# Patient Record
Sex: MALE | Race: WHITE | NOT HISPANIC OR LATINO | Employment: OTHER | URBAN - NONMETROPOLITAN AREA
[De-identification: names, ages, dates, MRNs, and addresses within clinical notes are randomized per-mention and may not be internally consistent; named-entity substitution may affect disease eponyms.]

---

## 2017-02-21 ENCOUNTER — OFFICE VISIT (OUTPATIENT)
Dept: OCCUPATIONAL MEDICINE | Age: 42
End: 2017-02-21

## 2017-02-21 DIAGNOSIS — Z02.1 DRUG TESTING, PRE-EMPLOYMENT: Primary | ICD-10-CM

## 2017-02-21 PROCEDURE — OH035 DOT/N-DOT DS COLLECTION ONLY (ALL TYPES): Performed by: PHYSICIAN ASSISTANT

## 2017-02-21 PROCEDURE — OH044 DRUG SCREEN HAIR COLLECTION ONLY: Performed by: PHYSICIAN ASSISTANT

## 2017-08-09 ENCOUNTER — OFFICE VISIT (OUTPATIENT)
Dept: OCCUPATIONAL MEDICINE | Age: 42
End: 2017-08-09

## 2017-08-09 ENCOUNTER — APPOINTMENT (OUTPATIENT)
Dept: REHABILITATION | Age: 42
End: 2017-08-09

## 2017-08-09 VITALS
TEMPERATURE: 98 F | RESPIRATION RATE: 16 BRPM | DIASTOLIC BLOOD PRESSURE: 70 MMHG | HEART RATE: 68 BPM | SYSTOLIC BLOOD PRESSURE: 118 MMHG | WEIGHT: 200 LBS | BODY MASS INDEX: 27.09 KG/M2 | HEIGHT: 72 IN

## 2017-08-09 DIAGNOSIS — Z02.1 PRE-EMPLOYMENT HEALTH SCREENING EXAMINATION: ICD-10-CM

## 2017-08-09 DIAGNOSIS — Z02.89 ENCOUNTER FOR OCCUPATIONAL HEALTH EXAMINATION: Primary | ICD-10-CM

## 2017-08-09 LAB
BILIRUB UR QL: NORMAL
CLARITY UR: NORMAL
COLOR UR: NORMAL
CULT IF POS: NORMAL
GLUCOSE UR-MCNC: NORMAL MG/DL
HGB UR QL: NORMAL
KETONES UR-MCNC: NORMAL MG/DL
LEUKOCYTE ESTERASE UR QL STRIP: NORMAL
NITRITE UR QL: NORMAL
PH UR: NORMAL [PH]
PROT UR QL: NORMAL
SP GR UR STRIP: 1.01
UROBILINOGEN UR STRIP-MCNC: NORMAL MG/DL

## 2017-08-09 PROCEDURE — 81003 URINALYSIS AUTO W/O SCOPE: CPT | Performed by: CHIROPRACTOR

## 2017-08-09 PROCEDURE — OH048 DOT VISION: Performed by: CHIROPRACTOR

## 2017-08-09 PROCEDURE — OH023 DOT/CDL PHYSICAL EXAM: Performed by: CHIROPRACTOR

## 2017-08-09 PROCEDURE — OH053 WHISPER TEST PERFORMED IN OCC HEALTH: Performed by: CHIROPRACTOR

## 2017-08-09 PROCEDURE — OH168 POST OFFER LEVE 11: Performed by: CHIROPRACTOR

## 2018-02-22 ENCOUNTER — OFFICE VISIT (OUTPATIENT)
Dept: OCCUPATIONAL MEDICINE | Age: 43
End: 2018-02-22

## 2018-02-22 VITALS
WEIGHT: 212 LBS | HEART RATE: 80 BPM | DIASTOLIC BLOOD PRESSURE: 82 MMHG | HEIGHT: 72 IN | SYSTOLIC BLOOD PRESSURE: 124 MMHG | BODY MASS INDEX: 28.71 KG/M2

## 2018-02-22 DIAGNOSIS — Z02.89 ENCOUNTER FOR OCCUPATIONAL HEALTH EXAMINATION: Primary | ICD-10-CM

## 2018-02-22 PROCEDURE — OH053 WHISPER TEST PERFORMED IN OCC HEALTH: Performed by: NURSE PRACTITIONER

## 2018-02-22 PROCEDURE — 81003 URINALYSIS AUTO W/O SCOPE: CPT | Performed by: NURSE PRACTITIONER

## 2018-02-22 PROCEDURE — OH023 DOT/CDL PHYSICAL EXAM: Performed by: NURSE PRACTITIONER

## 2018-02-22 PROCEDURE — OH035 DOT/N-DOT DS COLLECTION ONLY (ALL TYPES): Performed by: NURSE PRACTITIONER

## 2018-02-22 PROCEDURE — OH048 DOT VISION: Performed by: NURSE PRACTITIONER

## 2018-10-04 ENCOUNTER — OFFICE VISIT (OUTPATIENT)
Dept: OCCUPATIONAL MEDICINE | Age: 43
End: 2018-10-04

## 2018-10-04 DIAGNOSIS — Z02.1 DRUG TESTING, PRE-EMPLOYMENT: ICD-10-CM

## 2018-10-04 PROCEDURE — OH035 DOT/N-DOT DS COLLECTION ONLY (ALL TYPES): Performed by: NURSE PRACTITIONER

## 2019-01-30 ENCOUNTER — OFFICE VISIT (OUTPATIENT)
Dept: OCCUPATIONAL MEDICINE | Age: 44
End: 2019-01-30

## 2019-01-30 VITALS
HEART RATE: 66 BPM | BODY MASS INDEX: 25.81 KG/M2 | SYSTOLIC BLOOD PRESSURE: 124 MMHG | HEIGHT: 72 IN | WEIGHT: 190.6 LBS | DIASTOLIC BLOOD PRESSURE: 84 MMHG

## 2019-01-30 DIAGNOSIS — Z02.89 ENCOUNTER FOR OCCUPATIONAL HEALTH EXAMINATION: Primary | ICD-10-CM

## 2019-01-30 LAB
APPEARANCE UR: NORMAL
BILIRUB UR QL: NEGATIVE
COLOR UR: YELLOW
GLUCOSE BLDC GLUCOMTR-MCNC: 81 MG/DL
GLUCOSE UR-MCNC: NORMAL MG/DL
HGB UR QL: NEGATIVE
KETONES UR-MCNC: NORMAL MG/DL
LENGTH OF FAST TIME PATIENT: NORMAL H
LEUKOCYTE ESTERASE UR QL STRIP: NEGATIVE
NITRITE UR QL: NEGATIVE
PH UR: 6 [PH]
PROT UR QL: NEGATIVE
SP GR UR: 1.03
SPECIMEN SOURCE: NORMAL
URNS CMNT MICRO: NORMAL
UROBILINOGEN UR QL: NORMAL

## 2019-01-30 PROCEDURE — OH048 DOT VISION: Performed by: PREVENTIVE MEDICINE

## 2019-01-30 PROCEDURE — 82962 GLUCOSE BLOOD TEST: CPT | Performed by: PREVENTIVE MEDICINE

## 2019-01-30 PROCEDURE — OH035 DOT/N-DOT DS COLLECTION ONLY (ALL TYPES): Performed by: PREVENTIVE MEDICINE

## 2019-01-30 PROCEDURE — OH023 DOT/CDL PHYSICAL EXAM: Performed by: PREVENTIVE MEDICINE

## 2019-01-30 PROCEDURE — 81003 URINALYSIS AUTO W/O SCOPE: CPT | Performed by: PREVENTIVE MEDICINE

## 2019-01-30 PROCEDURE — OH053 WHISPER TEST PERFORMED IN OCC HEALTH: Performed by: PREVENTIVE MEDICINE

## 2019-01-30 PROCEDURE — OH044 DRUG SCREEN HAIR COLLECTION ONLY: Performed by: PREVENTIVE MEDICINE

## 2019-03-05 ENCOUNTER — OCC HEALTH (OUTPATIENT)
Dept: OCCUPATIONAL MEDICINE | Age: 44
End: 2019-03-05

## 2019-03-05 VITALS
HEIGHT: 72 IN | HEART RATE: 66 BPM | BODY MASS INDEX: 26.32 KG/M2 | SYSTOLIC BLOOD PRESSURE: 108 MMHG | DIASTOLIC BLOOD PRESSURE: 70 MMHG | WEIGHT: 194.3 LBS

## 2019-03-05 DIAGNOSIS — Z02.89 VISIT FOR OCCUPATIONAL HEALTH EXAMINATION: ICD-10-CM

## 2019-03-05 DIAGNOSIS — Z02.89 ENCOUNTER FOR OCCUPATIONAL HEALTH EXAMINATION: Primary | ICD-10-CM

## 2019-03-05 PROCEDURE — OH021 PRE PLACEMENT PHYSICAL EXAM: Performed by: CHIROPRACTOR

## 2019-03-05 PROCEDURE — OH143 RAPID TEST DRUG KIT & COLLECTION 10 PANEL: Performed by: CHIROPRACTOR

## 2019-08-06 ENCOUNTER — OFFICE VISIT (OUTPATIENT)
Dept: REHABILITATION | Age: 44
End: 2019-08-06

## 2019-08-06 ENCOUNTER — OFFICE VISIT (OUTPATIENT)
Dept: OCCUPATIONAL MEDICINE | Age: 44
End: 2019-08-06

## 2019-08-06 VITALS
DIASTOLIC BLOOD PRESSURE: 82 MMHG | SYSTOLIC BLOOD PRESSURE: 132 MMHG | HEART RATE: 66 BPM | BODY MASS INDEX: 27.04 KG/M2 | WEIGHT: 199.6 LBS | TEMPERATURE: 98.1 F | HEIGHT: 72 IN | RESPIRATION RATE: 16 BRPM

## 2019-08-06 DIAGNOSIS — Z02.89 ENCOUNTER FOR OCCUPATIONAL HEALTH EXAMINATION: Primary | ICD-10-CM

## 2019-08-06 LAB
APPEARANCE UR: NORMAL
BILIRUB UR QL: NORMAL
COLOR UR: NORMAL
GLUCOSE UR-MCNC: NORMAL MG/DL
HGB UR QL: NORMAL
KETONES UR-MCNC: NORMAL MG/DL
LEUKOCYTE ESTERASE UR QL STRIP: NORMAL
NITRITE UR QL: NORMAL
PH UR: NORMAL [PH]
PROT UR QL: NORMAL
SP GR UR: 1.01
SPECIMEN SOURCE: NORMAL
URNS CMNT MICRO: NORMAL
UROBILINOGEN UR QL: NORMAL

## 2019-08-06 PROCEDURE — OH053 WHISPER TEST PERFORMED IN OCC HEALTH: Performed by: NURSE PRACTITIONER

## 2019-08-06 PROCEDURE — OH039 DOT DRUG SCREENS BUNDLED: Performed by: NURSE PRACTITIONER

## 2019-08-06 PROCEDURE — OH048 DOT VISION: Performed by: NURSE PRACTITIONER

## 2019-08-06 PROCEDURE — 81003 URINALYSIS AUTO W/O SCOPE: CPT | Performed by: NURSE PRACTITIONER

## 2019-08-06 PROCEDURE — OH023 DOT/CDL PHYSICAL EXAM: Performed by: NURSE PRACTITIONER

## 2019-08-06 PROCEDURE — OH063 AUDIOMETRIC TESTING INTERP: Performed by: NURSE PRACTITIONER

## 2019-08-06 PROCEDURE — OH056 PRE PLACEMENT FUNCTIONAL TESTING: Performed by: PHYSICAL THERAPY ASSISTANT

## 2019-08-06 PROCEDURE — 92552 PURE TONE AUDIOMETRY AIR: CPT | Performed by: NURSE PRACTITIONER

## 2020-03-25 ENCOUNTER — OFFICE VISIT (OUTPATIENT)
Dept: OCCUPATIONAL MEDICINE | Age: 45
End: 2020-03-25

## 2020-03-25 DIAGNOSIS — Z02.1 DRUG TESTING, PRE-EMPLOYMENT: Primary | ICD-10-CM

## 2020-03-25 PROCEDURE — OH172 ESCREEN DRUG COLLECTION ONLY: Performed by: CHIROPRACTOR

## 2020-05-19 ENCOUNTER — NURSE ONLY (OUTPATIENT)
Dept: OCCUPATIONAL MEDICINE | Age: 45
End: 2020-05-19

## 2020-05-19 DIAGNOSIS — Z02.83 ENCOUNTER FOR EMPLOYMENT-RELATED DRUG TESTING: Primary | ICD-10-CM

## 2020-05-19 PROCEDURE — OH172 ESCREEN DRUG COLLECTION ONLY: Performed by: NURSE PRACTITIONER

## 2020-08-02 ENCOUNTER — HOSPITAL ENCOUNTER (EMERGENCY)
Facility: HOSPITAL | Age: 45
Discharge: HOME | End: 2020-08-02

## 2020-08-02 VITALS
SYSTOLIC BLOOD PRESSURE: 136 MMHG | BODY MASS INDEX: 26.23 KG/M2 | HEART RATE: 103 BPM | OXYGEN SATURATION: 96 % | DIASTOLIC BLOOD PRESSURE: 84 MMHG | WEIGHT: 183.2 LBS | HEIGHT: 70 IN | TEMPERATURE: 97.9 F | RESPIRATION RATE: 16 BRPM

## 2020-08-02 DIAGNOSIS — S61.219A FINGER LACERATION: Primary | ICD-10-CM

## 2020-08-02 PROCEDURE — 99282 EMERGENCY DEPT VISIT SF MDM: CPT

## 2020-08-02 PROCEDURE — 12002 RPR S/N/AX/GEN/TRNK2.6-7.5CM: CPT | Performed by: NURSE PRACTITIONER

## 2020-08-02 RX ORDER — NAPROXEN 500 MG/1
500 TABLET ORAL 2 TIMES DAILY WITH MEALS
Qty: 60 TABLET | Refills: 0 | Status: SHIPPED | OUTPATIENT
Start: 2020-08-02 | End: 2020-09-01

## 2020-08-02 NOTE — ED PROVIDER NOTES
Chief Complaint   Patient presents with   • Finger Laceration     pt arrives after cutting his left fifth finger with a utility knife, site is bleeding at this time            HPI:    Patient is a 44 y.o. male who presents with with a laceration to the left proximal palmar of the fifth finger.  Patient states that he cut this with a utility knife.  Patient was voicing concern is Friday is unable to keep bleeding controlled at this time.  Patient states he is up-to-date on his immunization including his tetanus.  Denies any numbness or tingling.  He is able to demonstrate full extension and flexion of the finger,        History reviewed. No pertinent past medical history.    History reviewed. No pertinent surgical history.    Social History     Socioeconomic History   • Marital status:      Spouse name: Not on file   • Number of children: Not on file   • Years of education: Not on file   • Highest education level: Not on file   Occupational History   • Not on file   Social Needs   • Financial resource strain: Not on file   • Food insecurity     Worry: Not on file     Inability: Not on file   • Transportation needs     Medical: Not on file     Non-medical: Not on file   Tobacco Use   • Smoking status: Current Every Day Smoker     Packs/day: 1.00     Types: Cigarettes   Substance and Sexual Activity   • Alcohol use: Not on file   • Drug use: Not on file   • Sexual activity: Not on file   Lifestyle   • Physical activity     Days per week: Not on file     Minutes per session: Not on file   • Stress: Not on file   Relationships   • Social connections     Talks on phone: Not on file     Gets together: Not on file     Attends [employer] service: Not on file     Active member of club or organization: Not on file     Attends meetings of clubs or organizations: Not on file     Relationship status: Not on file   • Intimate partner violence     Fear of current or ex partner: Not on file     Emotionally abused: Not on file      Physically abused: Not on file     Forced sexual activity: Not on file   Other Topics Concern   • Not on file   Social History Narrative   • Not on file       History reviewed. No pertinent family history.    No Known Allergies      Current Outpatient Medications:   •  naproxen (NAPROSYN) 500 mg tablet, Take 1 tablet (500 mg total) by mouth 2 (two) times a day with meals, Disp: 60 tablet, Rfl: 0      ROS:  Constitutional: negative for fever  Eyes: negative for eye pain  ENT: negative for sore throat  Cardiovascular: negative for chest pain  Respiratory: negative for hemoptysis  GI: negative for abdominal pain  : negative for hematuria  Musculoskeletal: negative for back pain, positive left fifth finger laceration  Neuro: negative for headache  Hematology: negative for bleeding  Immunology: negative for joint pain      ED Triage Vitals [08/02/20 1310]   Temp Heart Rate Resp BP SpO2   36.6 °C (97.9 °F) 103 16 136/84 96 %      Temp Source Heart Rate Source Patient Position BP Location FiO2 (%)   Oral -- -- -- --         Physical Exam:  Nursing note and vitals reviewed.  Constitutional: Nontoxic, non-ill-appearing male who appears well-developed.   HENT:   Head: Normocephalic and atraumatic.   Eyes: Pupils are equal, round, and reactive to light.   Cardiovascular: Regular rate and rhythm with no murmur, rub, or gallop.  Normal pulses.  Pulmonary/Chest: No respiratory distress.  Clear to auscultation bilaterally.  Abdominal: Soft and nontender.    Musculoskeletal: No edema, left fifth finger laceration to the palmar proximal phalanx region.  Bleeding is well controlled, no neurovascular deficits are noted, patient is able to demonstrate normal range of motion full extension of the finger as well as full flexion, good  with the fingers present on exam.  No tendon damage appears to be present.  Neurological: Alert.   Skin: Skin is warm and dry. No rash noted.  Laceration of the left fifth  Psychiatric: Normal  mood and affect.           MDM:     44-year male presents [facility] department for evaluation of a left small finger laceration.  Patient during laceration and repair refused anesthetic including digital nerve block and local anesthetic to the laceration site.  Wound was thoroughly cleaned and closed without any complications patient actually tolerated this very well, considering he refused local anesthetic.  It was closed with good hemostasis, edges were well approximated, no neurovascular deficits.  Splint was applied, no neurovascular deficits.  Patient is instructed to return in 10 to 12 days for suture removal.  He is recommended follow-up with primary care provider for any signs of infection.  This is a nontoxic, non-ill-appearing male in no acute distress.  Patient does ask at time of discharge if he can have a prescription for pain medicine.  I explained to the patient that this would not be necessary at this time as he was able to have his completely finger repaired without any anesthetics.  He would do fine with Tylenol and ibuprofen.  Patient is given a prescription for naproxen.  Is encouraged to keep elevated, return if any worsening symptoms new concerns.    Labs Reviewed - No data to display    No orders to display           PROCEDURES:  Laceration Repair    Date/Time: 8/2/2020 6:14 PM  Performed by: Gifty Chaparro CNP  Authorized by: Gifty Chaparro CNP     Consent:     Consent obtained:  Verbal and emergent situation    Consent given by:  Patient    Risks discussed:  Infection, need for additional repair, nerve damage, poor wound healing, poor cosmetic result, pain, retained foreign body, tendon damage and vascular damage    Alternatives discussed:  No treatment, delayed treatment, observation and referral  Anesthesia (see MAR for exact dosages):     Anesthesia method:  None (Patient multiple times refused numbness or nerve block to finger for repair.)  Laceration details:     Location:   Finger    Finger location:  L small finger    Length (cm):  3.5  Repair type:     Repair type:  Simple  Pre-procedure details:     Preparation:  Patient was prepped and draped in usual sterile fashion  Exploration:     Hemostasis achieved with:  Direct pressure    Wound exploration: wound explored through full range of motion      Wound extent: areolar tissue violated and fascia violated      Wound extent: no foreign bodies/material noted, no muscle damage noted, no nerve damage noted, no tendon damage noted, no underlying fracture noted and no vascular damage noted      Contaminated: no    Treatment:     Area cleansed with:  Saline    Amount of cleaning:  Standard    Irrigation solution:  Sterile saline    Irrigation method:  Syringe    Visualized foreign bodies/material removed: no    Skin repair:     Repair method:  Sutures    Suture size:  4-0    Suture material:  Nylon    Suture technique:  Simple interrupted  Approximation:     Approximation:  Close  Post-procedure details:     Dressing:  Antibiotic ointment and non-adherent dressing    Patient tolerance of procedure:  Tolerated well, no immediate complications        ED COURSE:       CLINICAL IMPRESSION:  Final diagnoses:   [S64.920A] Finger laceration         A voice recognition program was used to aid in documentation of this record.  Sometimes words are not printed exactly as they were spoken.  While efforts were made to carefully edit and correct any inaccuracies, some areas may be present; please take these into context.  Please contact the provider if areas are identified.     Gifty Chaparro, CNP  08/02/20 [id number]

## 2020-08-02 NOTE — DISCHARGE INSTRUCTIONS
Keep sutures clean and dry, recommend keeping splint in place for the next 2 days.    Keep finger elevated, okay to ice if any swelling.    Sutures are to be removed in the next 10 to 12 days.  This can be done in the emergency department without any additional fees.  You can also have this done at your primary care provider.    Recommend naproxen as directed for pain.  Is okay to use 1000 mg of Tylenol 3-4 times a day for breakthrough pain.    Return for any signs of infections.  Any new concerns please follow-up with primary care provider return the emerge department.

## 2020-08-12 ENCOUNTER — NURSE ONLY (OUTPATIENT)
Dept: OCCUPATIONAL MEDICINE | Age: 45
End: 2020-08-12

## 2020-08-12 DIAGNOSIS — Z02.83 ENCOUNTER FOR EMPLOYMENT-RELATED DRUG TESTING: Primary | ICD-10-CM

## 2020-08-12 PROCEDURE — OH039 DOT DRUG SCREENS BUNDLED: Performed by: NURSE PRACTITIONER

## 2020-08-19 ENCOUNTER — TELEPHONE (OUTPATIENT)
Dept: OCCUPATIONAL MEDICINE | Age: 45
End: 2020-08-19

## 2021-02-23 ENCOUNTER — OFFICE VISIT (OUTPATIENT)
Dept: OCCUPATIONAL MEDICINE | Age: 46
End: 2021-02-23

## 2021-02-23 DIAGNOSIS — Z02.1 DRUG TESTING, PRE-EMPLOYMENT: Primary | ICD-10-CM

## 2021-02-23 PROCEDURE — OH035 DOT/N-DOT DS COLLECTION ONLY (ALL TYPES): Performed by: PHYSICIAN ASSISTANT

## 2021-05-04 ENCOUNTER — OFFICE VISIT (OUTPATIENT)
Dept: OCCUPATIONAL MEDICINE | Age: 46
End: 2021-05-04

## 2021-05-04 DIAGNOSIS — Z00.8 HEALTH EXAMINATION IN POPULATION SURVEY: ICD-10-CM

## 2021-05-04 PROCEDURE — OH035 DOT/N-DOT DS COLLECTION ONLY (ALL TYPES): Performed by: PREVENTIVE MEDICINE

## 2021-06-25 ENCOUNTER — OFFICE VISIT (OUTPATIENT)
Dept: OCCUPATIONAL MEDICINE | Age: 46
End: 2021-06-25

## 2021-06-25 DIAGNOSIS — Z02.83 ENCOUNTER FOR DRUG SCREENING: Primary | ICD-10-CM

## 2021-06-25 PROCEDURE — OH039 DOT DRUG SCREENS BUNDLED: Performed by: CHIROPRACTOR

## 2021-06-25 PROCEDURE — OH144 SALIVA BREATHE ALCOHOL: Performed by: CHIROPRACTOR

## 2021-09-20 ENCOUNTER — OFFICE VISIT (OUTPATIENT)
Dept: OCCUPATIONAL MEDICINE | Age: 46
End: 2021-09-20

## 2021-09-20 DIAGNOSIS — Z02.1 DRUG TESTING, PRE-EMPLOYMENT: Primary | ICD-10-CM

## 2021-09-20 PROCEDURE — OH035 DOT/N-DOT DS COLLECTION ONLY (ALL TYPES): Performed by: CHIROPRACTOR

## 2022-01-25 ENCOUNTER — OFFICE VISIT (OUTPATIENT)
Dept: OCCUPATIONAL MEDICINE | Age: 47
End: 2022-01-25

## 2022-01-25 DIAGNOSIS — Z02.83 ENCOUNTER FOR DRUG SCREENING: Primary | ICD-10-CM

## 2022-01-25 PROCEDURE — OH035 DOT/N-DOT DS COLLECTION ONLY (ALL TYPES): Performed by: NURSE PRACTITIONER

## 2022-01-25 PROCEDURE — OH044 DRUG SCREEN HAIR COLLECTION ONLY: Performed by: NURSE PRACTITIONER

## 2022-05-05 ENCOUNTER — OFFICE VISIT (OUTPATIENT)
Dept: OCCUPATIONAL MEDICINE | Age: 47
End: 2022-05-05

## 2022-05-05 DIAGNOSIS — Z02.83 ENCOUNTER FOR DRUG SCREENING: Primary | ICD-10-CM

## 2022-05-05 PROCEDURE — OH039 DOT DRUG SCREENS BUNDLED: Performed by: NURSE PRACTITIONER

## 2022-06-09 ENCOUNTER — APPOINTMENT (OUTPATIENT)
Dept: OCCUPATIONAL MEDICINE | Age: 47
End: 2022-06-09